# Patient Record
Sex: FEMALE | Race: WHITE | NOT HISPANIC OR LATINO | Employment: OTHER | URBAN - METROPOLITAN AREA
[De-identification: names, ages, dates, MRNs, and addresses within clinical notes are randomized per-mention and may not be internally consistent; named-entity substitution may affect disease eponyms.]

---

## 2022-01-01 ENCOUNTER — TRANSFERRED RECORDS (OUTPATIENT)
Dept: HEALTH INFORMATION MANAGEMENT | Facility: CLINIC | Age: 67
End: 2022-01-01

## 2022-01-01 LAB
ALT SERPL-CCNC: 49 U/L (ref 18–65)
AST SERPL-CCNC: 61 U/L (ref 10–30)
CHOLESTEROL (EXTERNAL): 200 MG/DL
HDLC SERPL-MCNC: 41 MG/DL
INR (EXTERNAL): 1 (ref 0.9–1.1)
LDL CHOLESTEROL CALCULATED (EXTERNAL): 131 MG/DL
NON HDL CHOLESTEROL (EXTERNAL): 159 MG/DL
TRIGLYCERIDES (EXTERNAL): 139 MG/DL

## 2023-01-01 ENCOUNTER — PATIENT OUTREACH (OUTPATIENT)
Dept: ONCOLOGY | Facility: CLINIC | Age: 68
End: 2023-01-01
Payer: COMMERCIAL

## 2023-01-01 ENCOUNTER — TELEPHONE (OUTPATIENT)
Dept: NEUROSURGERY | Facility: CLINIC | Age: 68
End: 2023-01-01

## 2023-01-01 ENCOUNTER — DOCUMENTATION ONLY (OUTPATIENT)
Dept: NEUROLOGY | Facility: CLINIC | Age: 68
End: 2023-01-01
Payer: COMMERCIAL

## 2023-01-01 ENCOUNTER — PRE VISIT (OUTPATIENT)
Dept: NEUROSURGERY | Facility: CLINIC | Age: 68
End: 2023-01-01

## 2023-01-01 ENCOUNTER — VIRTUAL VISIT (OUTPATIENT)
Dept: NEUROSURGERY | Facility: CLINIC | Age: 68
End: 2023-01-01
Attending: NEUROLOGICAL SURGERY
Payer: COMMERCIAL

## 2023-01-01 DIAGNOSIS — C71.9 GLIOBLASTOMA (H): Primary | ICD-10-CM

## 2023-01-01 DIAGNOSIS — G93.89 BRAIN MASS: Primary | ICD-10-CM

## 2023-01-01 LAB
CREATININE (EXTERNAL): 0.69 MG/DL (ref 0.7–1.2)
GFR ESTIMATED (EXTERNAL): >60 ML/MIN/1.73M2
GLUCOSE (EXTERNAL): 102 MG/DL (ref 60–99)
POTASSIUM (EXTERNAL): 3.3 MMOL/L (ref 3.5–5.1)

## 2023-01-01 PROCEDURE — G0463 HOSPITAL OUTPT CLINIC VISIT: HCPCS | Mod: PN,TEL | Performed by: NEUROLOGICAL SURGERY

## 2023-01-01 PROCEDURE — 99203 OFFICE O/P NEW LOW 30 MIN: CPT | Mod: 95 | Performed by: NEUROLOGICAL SURGERY

## 2023-01-01 RX ORDER — LISINOPRIL 40 MG/1
TABLET ORAL
COMMUNITY

## 2023-01-01 RX ORDER — HYDROCHLOROTHIAZIDE 25 MG/1
TABLET ORAL
COMMUNITY

## 2023-01-01 RX ORDER — LEVETIRACETAM 500 MG/1
500 TABLET ORAL 2 TIMES DAILY
COMMUNITY

## 2023-01-02 NOTE — TELEPHONE ENCOUNTER
Writer spoke with pt to set up MRN and schedule a visit with Dr. Freitas on 1/6. Pt provided verbal consent for writer to pull records from [facility] into [facility].    Mckayla Boone

## 2023-01-03 NOTE — TELEPHONE ENCOUNTER
1/3/2023-Request for records and Images faxed to [facility]-MR @ 759am    1/6/2023-Images in PACS, records scanned to chart-MR @ 430am      FUTURE VISIT INFORMATION      FUTURE VISIT INFORMATION:    Date: 1/6/2023    Time: 1215pm    Location: [facility]  REFERRAL INFORMATION:    Referring provider:      Referring providers clinic:      Reason for visit/diagnosis      RECORDS REQUESTED FROM:       Clinic name Comments Records Status Imaging Status   [facility]  Requested Requested

## 2023-01-06 NOTE — LETTER
Date:January 9, 2023      Provider requested that no letter be sent. Do not send.       [facility]

## 2023-01-06 NOTE — PROGRESS NOTES
Merari is a 67 year old who is being evaluated via a billable telephone visit.      Patient stated she is in the state of [place] for the visit today.    What phone number would you like to be contacted at? [phone]  How would you like to obtain your AVS? 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 The patient presents for surgical consideration.    On review of systems, the patient denied new symptoms or another episode of LOC.    PMH: HTN    Current Outpatient Medications:      levETIRAcetam (KEPPRA) 500 MG tablet, Take 500 mg by mouth 2 times daily, Disp: , Rfl:      lisinopril (ZESTRIL) 40 MG tablet, 1/2 tablet Orally Once a day for 90 days, Disp: , Rfl:      hydrochlorothiazide (HYDRODIURIL) 25 MG tablet, 1 capsule in the morning Orally Once a day for 90 days (Patient not taking: Reported on 1/6/2023), Disp: , Rfl:      Allergies   Allergen Reactions     Atorvastatin Muscle Pain (Myalgia)     AP: 67 F with MRI finding concerning for a glioma involving the right precuneus. Given recent seizure, it is critical to repeat an MRI to distinguish seizure related FLAIR versus glioma. I will also order an MRS to better assess the likelihood of glioma. I will see the patient after completion of the MRI/MRS to discuss the next steps.

## 2023-01-06 NOTE — LETTER
1/6/2023         RE: Merari Terry  [address]  [place] [address]        Dear Colleague,    Thank you for referring your patient, Merari Terry, to the [facility]. Please see a copy of my visit note below.    Merari is a 67 year old who is being evaluated via a billable telephone visit.      Patient stated she is in the state of [place] for the visit today.    What phone number would you like to be contacted at? [phone]  How would you like to obtain your AVS? 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 The patient presents for surgical consideration.    On review of systems, the patient denied new symptoms or another episode of LOC.    PMH: HTN    Current Outpatient Medications:      levETIRAcetam (KEPPRA) 500 MG tablet, Take 500 mg by mouth 2 times daily, Disp: , Rfl:      lisinopril (ZESTRIL) 40 MG tablet, 1/2 tablet Orally Once a day for 90 days, Disp: , Rfl:      hydrochlorothiazide (HYDRODIURIL) 25 MG tablet, 1 capsule in the morning Orally Once a day for 90 days (Patient not taking: Reported on 1/6/2023), Disp: , Rfl:      Allergies   Allergen Reactions     Atorvastatin Muscle Pain (Myalgia)     AP: 67 F with MRI finding concerning for a glioma involving the right precuneus. Given recent seizure, it is critical to repeat an MRI to distinguish seizure related FLAIR versus glioma. I will also order an MRS to better assess the likelihood of glioma. I will see the patient after completion of the MRI/MRS to discuss the next steps.        Again, thank you for allowing me to participate in the care of your patient.        Sincerely,        Julian Freitas MD

## 2023-01-06 NOTE — NURSING NOTE
Patient requesting Rx refill for levetiracetam 500mg BID. Pharmacy added. Pt does have appt w/PCP next week if unable to fill medication today.  Keshia Flynn, Virtual Facilitator

## 2023-02-16 NOTE — PROGRESS NOTES
I'm working on getting Merari rescheduled for MR Spectroscopy and MR brain.  She requested to have this done in [place], which is much closer to her home.     Spoke with [facility] who does imaging for [facility].   They would need to use the 3T machine for Spectroscopy and they do not use general anesthesia with this machine.  I also confirmed with imaging supervisor and there are no other options.     Spoke with Merari.  Asked if she would be able to come to [facility] to try the Spectroscopy with general anesthesia.  She is not able to have imaging done at [facility] due to travel distance and financial reasons.  She reiterated, there is no way she would be able to do any imaging without general anesthesia since she is severely claustrophobic.     Message sent to Dr. Freitas to see if there are other options.     2/16 Per Dr. Freitas:  MR spectroscopy needs to be done at the [facility].       If the patient cannot travel to the [facility], then we can only repeat a regular MRI closer to her home.     LM for Merari to call me so I can discuss Dr. Freitas's recommendations.      Josefina Rosario, RN